# Patient Record
Sex: FEMALE | Race: WHITE | Employment: OTHER | URBAN - METROPOLITAN AREA
[De-identification: names, ages, dates, MRNs, and addresses within clinical notes are randomized per-mention and may not be internally consistent; named-entity substitution may affect disease eponyms.]

---

## 2021-09-25 ENCOUNTER — HOSPITAL ENCOUNTER (EMERGENCY)
Age: 81
Discharge: HOME HEALTH | End: 2021-09-25
Attending: EMERGENCY MEDICINE
Payer: MEDICARE

## 2021-09-25 ENCOUNTER — APPOINTMENT (OUTPATIENT)
Dept: CT IMAGING | Age: 81
End: 2021-09-25
Payer: MEDICARE

## 2021-09-25 ENCOUNTER — APPOINTMENT (OUTPATIENT)
Dept: GENERAL RADIOLOGY | Age: 81
End: 2021-09-25
Payer: MEDICARE

## 2021-09-25 VITALS
DIASTOLIC BLOOD PRESSURE: 86 MMHG | BODY MASS INDEX: 26.68 KG/M2 | HEART RATE: 87 BPM | TEMPERATURE: 98 F | OXYGEN SATURATION: 97 % | HEIGHT: 62 IN | WEIGHT: 145 LBS | SYSTOLIC BLOOD PRESSURE: 143 MMHG | RESPIRATION RATE: 14 BRPM

## 2021-09-25 DIAGNOSIS — J90 PLEURAL EFFUSION: Primary | ICD-10-CM

## 2021-09-25 LAB
ALBUMIN SERPL-MCNC: 3.5 GM/DL (ref 3.4–5)
ALP BLD-CCNC: 61 IU/L (ref 40–129)
ALT SERPL-CCNC: 17 U/L (ref 10–40)
ANION GAP SERPL CALCULATED.3IONS-SCNC: 13 MMOL/L (ref 4–16)
AST SERPL-CCNC: 16 IU/L (ref 15–37)
BASOPHILS ABSOLUTE: 0.2 K/CU MM
BASOPHILS RELATIVE PERCENT: 2 % (ref 0–1)
BILIRUB SERPL-MCNC: 0.6 MG/DL (ref 0–1)
BUN BLDV-MCNC: 14 MG/DL (ref 6–23)
CALCIUM SERPL-MCNC: 9.6 MG/DL (ref 8.3–10.6)
CHLORIDE BLD-SCNC: 97 MMOL/L (ref 99–110)
CO2: 21 MMOL/L (ref 21–32)
CREAT SERPL-MCNC: 0.5 MG/DL (ref 0.6–1.1)
DIFFERENTIAL TYPE: ABNORMAL
GFR AFRICAN AMERICAN: >60 ML/MIN/1.73M2
GFR NON-AFRICAN AMERICAN: >60 ML/MIN/1.73M2
GLUCOSE BLD-MCNC: 121 MG/DL (ref 70–99)
HCT VFR BLD CALC: 30.6 % (ref 37–47)
HEMOGLOBIN: 10.6 GM/DL (ref 12.5–16)
INR BLD: 1.08 INDEX
LYMPHOCYTES ABSOLUTE: 1.2 K/CU MM
LYMPHOCYTES RELATIVE PERCENT: 10 % (ref 24–44)
MCH RBC QN AUTO: 31.5 PG (ref 27–31)
MCHC RBC AUTO-ENTMCNC: 34.6 % (ref 32–36)
MCV RBC AUTO: 91.1 FL (ref 78–100)
MONOCYTES ABSOLUTE: 1.2 K/CU MM
MONOCYTES RELATIVE PERCENT: 10 % (ref 0–4)
PDW BLD-RTO: 12.7 % (ref 11.7–14.9)
PLATELET # BLD: 337 K/CU MM (ref 140–440)
PMV BLD AUTO: 9.6 FL (ref 7.5–11.1)
POTASSIUM SERPL-SCNC: 3.5 MMOL/L (ref 3.5–5.1)
PRO-BNP: 448.5 PG/ML
PROTHROMBIN TIME: 13.9 SECONDS (ref 11.7–14.5)
RBC # BLD: 3.36 M/CU MM (ref 4.2–5.4)
SARS-COV-2, NAAT: NOT DETECTED
SEGMENTED NEUTROPHILS ABSOLUTE COUNT: 9.2 K/CU MM
SEGMENTED NEUTROPHILS RELATIVE PERCENT: 78 % (ref 36–66)
SODIUM BLD-SCNC: 131 MMOL/L (ref 135–145)
SOURCE: NORMAL
TOTAL PROTEIN: 6.3 GM/DL (ref 6.4–8.2)
TROPONIN T: <0.01 NG/ML
WBC # BLD: 11.8 K/CU MM (ref 4–10.5)

## 2021-09-25 PROCEDURE — 84484 ASSAY OF TROPONIN QUANT: CPT

## 2021-09-25 PROCEDURE — 80053 COMPREHEN METABOLIC PANEL: CPT

## 2021-09-25 PROCEDURE — 87635 SARS-COV-2 COVID-19 AMP PRB: CPT

## 2021-09-25 PROCEDURE — 85610 PROTHROMBIN TIME: CPT

## 2021-09-25 PROCEDURE — 85027 COMPLETE CBC AUTOMATED: CPT

## 2021-09-25 PROCEDURE — 99283 EMERGENCY DEPT VISIT LOW MDM: CPT

## 2021-09-25 PROCEDURE — 71275 CT ANGIOGRAPHY CHEST: CPT

## 2021-09-25 PROCEDURE — 85007 BL SMEAR W/DIFF WBC COUNT: CPT

## 2021-09-25 PROCEDURE — 71045 X-RAY EXAM CHEST 1 VIEW: CPT

## 2021-09-25 PROCEDURE — 6360000004 HC RX CONTRAST MEDICATION: Performed by: EMERGENCY MEDICINE

## 2021-09-25 PROCEDURE — 83880 ASSAY OF NATRIURETIC PEPTIDE: CPT

## 2021-09-25 PROCEDURE — 2580000003 HC RX 258: Performed by: EMERGENCY MEDICINE

## 2021-09-25 RX ORDER — FAMOTIDINE 10 MG
10 TABLET ORAL 2 TIMES DAILY
COMMUNITY

## 2021-09-25 RX ORDER — OMEPRAZOLE 10 MG/1
10 CAPSULE, DELAYED RELEASE ORAL DAILY
COMMUNITY

## 2021-09-25 RX ORDER — AMLODIPINE BESYLATE 10 MG/1
10 TABLET ORAL DAILY
COMMUNITY

## 2021-09-25 RX ORDER — FUROSEMIDE 20 MG/1
20 TABLET ORAL DAILY
Qty: 5 TABLET | Refills: 0 | Status: SHIPPED | OUTPATIENT
Start: 2021-09-25 | End: 2021-09-30

## 2021-09-25 RX ORDER — SODIUM CHLORIDE 0.9 % (FLUSH) 0.9 %
20 SYRINGE (ML) INJECTION
Status: COMPLETED | OUTPATIENT
Start: 2021-09-25 | End: 2021-09-25

## 2021-09-25 RX ORDER — TAMOXIFEN CITRATE 10 MG/1
10 TABLET ORAL DAILY
COMMUNITY

## 2021-09-25 RX ADMIN — IOPAMIDOL 70 ML: 755 INJECTION, SOLUTION INTRAVENOUS at 13:26

## 2021-09-25 RX ADMIN — SODIUM CHLORIDE, PRESERVATIVE FREE 20 ML: 5 INJECTION INTRAVENOUS at 13:26

## 2021-09-25 NOTE — ED PROVIDER NOTES
eMERGENCY dEPARTMENT eNCOUnter        [facility]    Chief Complaint   Patient presents with    URI    Cough       HPI    Tamela Cm is a 80 y.o. female who presents with cough, congestion. States symptoms present for about 1 week. Started initially with nasal congestion, sore throat. States then developed cough. States cough has been nonproductive. She did receive a Covid vaccine. She denies fever, chills. Denies nausea or vomiting. States she has diarrhea which is typical for her. Denies lower extremity swelling. She apparently has uterine cancer with mets to the lung. She recently met with palliative care to stop chemotherapy. REVIEW OF SYSTEMS    Constitutional:  Denies fever, chills  HENT:  See above  Eyes: No vision change, discharge  Cardiovascular:  Denies chest pain, palpitations. Respiratory: + shortness of breath, + cough  GI:  Denies abdominal pain, vomiting, or diarrhea   :  Denies any urinary symptoms  Extremity: Denies edema, joint pain  Neurologic:  Denies headache, focal weakness  Skin: Denies rash, color change       All other review of systems are negative  See HPI and nursing notes for additional information       PAST MEDICAL AND SURGICAL HISTORY    Past Medical History:   Diagnosis Date    Arthritis     Asthma     Cancer [facility])      History reviewed. No pertinent surgical history.     CURRENT MEDICATIONS    Current Outpatient Rx   Medication Sig Dispense Refill    amLODIPine (NORVASC) 10 MG tablet Take 10 mg by mouth daily      omeprazole (PRILOSEC) 10 MG delayed release capsule Take 10 mg by mouth daily      famotidine (PEPCID) 10 MG tablet Take 10 mg by mouth 2 times daily      tamoxifen (NOLVADEX) 10 MG tablet Take 10 mg by mouth daily      dextromethorphan-guaiFENesin (MUCINEX DM)  MG per extended release tablet Take 1 tablet by mouth every 12 hours as needed         ALLERGIES    Allergies   Allergen Reactions    Azithromycin Nausea And Vomiting FAMILY AND SOCIAL HISTORY    History reviewed. No pertinent family history. Social History     Socioeconomic History    Marital status:      Spouse name: None    Number of children: None    Years of education: None    Highest education level: None   Occupational History    None   Tobacco Use    Smoking status: Never Smoker    Smokeless tobacco: Never Used   Substance and Sexual Activity    Alcohol use: Not Currently    Drug use: Never    Sexual activity: Never   Other Topics Concern    None   Social History Narrative    None     Social Determinants of Health     Financial Resource Strain:     Difficulty of Paying Living Expenses:    Food Insecurity:     Worried About Running Out of Food in the Last Year:     Ran Out of Food in the Last Year:    Transportation Needs:     Lack of Transportation (Medical):  Lack of Transportation (Non-Medical):    Physical Activity:     Days of Exercise per Week:     Minutes of Exercise per Session:    Stress:     Feeling of Stress :    Social Connections:     Frequency of Communication with Friends and Family:     Frequency of Social Gatherings with Friends and Family:     Attends Rastafarian Services:     Active Member of Clubs or Organizations:     Attends Club or Organization Meetings:     Marital Status:    Intimate Partner Violence:     Fear of Current or Ex-Partner:     Emotionally Abused:     Physically Abused:     Sexually Abused:        PHYSICAL EXAM    VITAL SIGNS: BP (!) 154/87   Pulse 98   Temp 98 °F (36.7 °C)   Resp 18   Ht 5' 2\" (1.575 m)   Wt 145 lb (65.8 kg)   SpO2 95%   BMI 26.52 kg/m²   Constitutional:  Awake, alert, no acute distress, non-toxic appearance   Eyes: PERRL, EOM intact. Conjunctiva normal.  HENT:  - Normocephalic, atraumatic     - Nasal passages with mildly erythematous   - Oropharynx mildly erythematous without tonsillar hypertrophy or exudate. No trimus.  Handling secretions without effusion, uterine cancer with mets to lung Decision Support Exception - unselect if not a suspected or confirmed emergency medical condition->Emergency Medical Condition (MA) Reason for Exam: large effusion, uterine cancer with mets to lung Acuity: Acute Type of Exam: Subsequent/Follow-up Additional signs and symptoms: cough, SOB Relevant Medical/Surgical History: Hx Mastectomy / 70cc [id number] FINDINGS: Patient motion in some areas, partially limiting evaluation of those areas. PULMONARY ARTERIES:  Moderate dilation of the pulmonary trunk in proportion with the ascending thoracic aorta. No definite dilation of intrapulmonary branches out of proportion with accompanying bronchi. Adequately opacified for evaluation. Suboptimal evaluation of segmental and subsegmental branches due to motion artifact. No definite filling defects consistent with emboli. MEDIASTINUM:  Mild cardiomegaly. Mild right ventricular hypertrophy. Mild concentric left ventricular hypertrophy. No flattening of the interventricular septum. No pericardial effusion. Mild systemic atherosclerosis. No mediastinal nor hilar lymphadenopathy. Calcified right lower paratracheal lymph node, likely a sequela of prior granulomatous disease. LUNGS/PLEURA:  Patent central airways. Passive atelectasis in the basilar right lung, including near complete collapse of the right middle and right lower lobes with some associated air bronchograms. Scattered noncalcified solid nodules in the aerated portions of the lungs measuring up to approximately 0.9 cm x 0.7 cm. Patchy linear opacities in the aerated lungs consistent with subsegmental atelectasis or scarring. Partially loculated large right pleural effusion with no definite right pleural nodularity. No pneumothoraces nor left pleural effusion. UPPER ABDOMEN:  Questionable vague wedge-shaped hypodensity in the subcapsular liver near segment 8.  At least trace intraperitoneal fluid with no definite peritoneal nodularity. SOFT TISSUES/BONES:   No supraclavicular nor axillary lymphadenopathy. Diffuse bony demineralization. No evident acute fracture with evaluation partially limited by motion in some areas. No suspicious bony lesions. 1. Motion artifact limits evaluation of the pulmonary artery segmental and subsegmental branches. Within this limitation, no definite findings of pulmonary embolism are identified 2. Multiple solid nodules in the aerated portions of the lungs measure up to 0.8 cm, likely metastatic disease given patient history. Recommend attention on follow-up imaging. 3. Partially loculated large right pleural effusion. Malignant effusion is not excluded, although no definite right pleural metastatic disease is identified. 4. At least trace intraperitoneal fluid. Malignant ascites is not excluded, although no definite peritoneal metastatic disease is identified. 5. Suggestion of indistinct hypodensity in the subcapsular liver near segment 8 potentially due to metastatic disease given patient history. Infarct and other etiologies could result in a similar appearance. Recommend further evaluation with liver protocol abdomen MRI (preferred, utilizing Eovist contrast) or CT on a nonemergent basis unless there has been prior outside imaging establishing an etiology. ED COURSE & MEDICAL DECISION MAKING       Vital signs and nursing notes reviewed during ED course. I have independently evaluated this patient . All pertinent Lab data and radiographic results reviewed with patient at bedside. The patient and/or the family were informed of the results of any tests/labs/imaging, the treatment plan, and time was allotted to answer questions. This is an 59-year-old female who has uterine cancer with mets to the lung who presented due to cough. Chest x-ray was done which shows large pleural effusion on the right. She was 95% on room air.   Work-up included labs which revealed a white count of 11.8, hemoglobin of 10.6. Electrolytes revealed sodium of 131. BNP was 448. Troponin was negative. CTA performed which was motion artifact, no definitive findings of pulmonary embolism. Multiple nodules, partially loculated large right pleural effusion. Likely to be malignant effusion based on imaging. She has hypodensity in the liver, she was aware of this finding from a previous CT scan. Given the findings she likely will need thoracentesis. I discussed this with her and her daughter who is at the bedside. I offered to transfer them to [facility] as her oncologist is there but she does not want to be admitted there over the weekend. She is not hypoxic, thus I feel that she can be discharged home and follow-up with them on Monday. Discussed calling her oncologist first thing on Monday morning, will likely need to set up through interventional radiology for thoracentesis. We will start small dose of Lasix in the meanwhile. She is instructed to increase potassium orally. If she develops any worsening shortness of breath, fever, has any distress she is instructed to return to emergency department immediately for reevaluation. She voices understanding the discharge instructions and return precautions. Of note a Covid test was sent, results are not immediately available at this time. Clinical  IMPRESSION    Pleural effusion      Diagnosis and plan discussed in detail with patient who understands and agrees. Patient agrees to return emergency department if symptoms worsen or any new symptoms develop.       (Please note the MDM and HPI sections of this note were completed with a voice recognition program.  Efforts were made to edit the dictations but occasionally words are mis-transcribed.)     Mikel Robles,   09/25/21 [id number]

## 2021-09-25 NOTE — ED TRIAGE NOTES
Discharge instructions and prescription  Information given to the patient, she expressed understanding of information and follow up care.